# Patient Record
Sex: FEMALE | Race: BLACK OR AFRICAN AMERICAN | NOT HISPANIC OR LATINO | Employment: FULL TIME | URBAN - METROPOLITAN AREA
[De-identification: names, ages, dates, MRNs, and addresses within clinical notes are randomized per-mention and may not be internally consistent; named-entity substitution may affect disease eponyms.]

---

## 2018-11-29 ENCOUNTER — HOSPITAL ENCOUNTER (EMERGENCY)
Facility: HOSPITAL | Age: 53
Discharge: HOME | End: 2018-11-29
Attending: EMERGENCY MEDICINE
Payer: COMMERCIAL

## 2018-11-29 VITALS
TEMPERATURE: 98 F | WEIGHT: 135 LBS | HEIGHT: 62 IN | DIASTOLIC BLOOD PRESSURE: 79 MMHG | SYSTOLIC BLOOD PRESSURE: 126 MMHG | OXYGEN SATURATION: 100 % | HEART RATE: 75 BPM | BODY MASS INDEX: 24.84 KG/M2 | RESPIRATION RATE: 18 BRPM

## 2018-11-29 DIAGNOSIS — M25.572 LEFT ANKLE PAIN: Primary | ICD-10-CM

## 2018-11-29 DIAGNOSIS — M77.50 ENTHESOPATHY OF ANKLE: ICD-10-CM

## 2018-11-29 DIAGNOSIS — M79.672 LEFT FOOT PAIN: ICD-10-CM

## 2018-11-29 DIAGNOSIS — M19.072 DJD (DEGENERATIVE JOINT DISEASE), ANKLE AND FOOT, LEFT: ICD-10-CM

## 2018-11-29 PROCEDURE — 99284 EMERGENCY DEPT VISIT MOD MDM: CPT | Mod: 25

## 2018-11-29 RX ORDER — HYDROCODONE BITARTRATE AND ACETAMINOPHEN 5; 325 MG/1; MG/1
1 TABLET ORAL EVERY 8 HOURS PRN
Qty: 8 TABLET | Refills: 0 | Status: SHIPPED | OUTPATIENT
Start: 2018-11-29

## 2018-11-29 RX ORDER — DICLOFENAC SODIUM 10 MG/G
2 GEL TOPICAL 3 TIMES DAILY
Qty: 100 G | Refills: 0 | Status: SHIPPED | OUTPATIENT
Start: 2018-11-29 | End: 2018-12-09

## 2018-11-29 NOTE — ED PROVIDER NOTES
"Encounter Date: 11/29/2018     This is a 53 y.o. female complaining of left foot pain that began yesterday morning. States "I think I was walking and I hurt it." Has not taken any medications for her pain.    I have evaluated and conducted a medical screening exam with initial orders entered, if indicated, to expedite care. The patient will be placed in a treatment area when one is available. Care will be transferred to an alternate provider for a full assessment including but not limited to: history, physical exam, additional orders, and final disposition.    Vinnie Gutierrez, SALO      SCRIBE #1 NOTE: I, Sarah Brambila, am scribing for, and in the presence of,  Pascual Haines PA-C. I have scribed the following portions of the note - Other sections scribed: HPI, ROS.       History     Chief Complaint   Patient presents with    Foot Pain     left foot pain x 2 days; report hx of hairline fracture 4 yrs ago     CC: Foot Pain    54 y/o female with DM, asthma, and HTN presents to the ED c/o acute onset atraumatic L lateral ankle pain that started yesterday. The pain is severe (10/10) and worse with weight-bearing. The patient reports waking up with the pain. The patient reports relief with crutches and foot brace. The patient denies prior injuries to her foot or ankle in the past. The patient denies numbness, fever, calf pain, or knee pain. No attempted treatment reported. No other symptoms reported.       The history is provided by the patient. No  was used.     Review of patient's allergies indicates:  No Known Allergies  Past Medical History:   Diagnosis Date    Asthma     Diabetes mellitus     Hypertension     Sinusitis      Past Surgical History:   Procedure Laterality Date    breast augment      BUNIONECTOMY Right     gastric sleeve      HYSTERECTOMY      LIPOSUCTION      TONSILLECTOMY       History reviewed. No pertinent family history.  Social History     Tobacco Use    Smoking " status: Current Every Day Smoker     Packs/day: 1.00     Years: 21.00     Pack years: 21.00     Types: Cigarettes   Substance Use Topics    Alcohol use: Yes     Alcohol/week: 1.8 oz     Types: 3 Cans of beer per week    Drug use: No     Review of Systems   Constitutional: Negative for chills and fever.   HENT: Negative for congestion, ear pain, rhinorrhea and sore throat.    Eyes: Negative for redness.   Respiratory: Negative for cough and shortness of breath.    Cardiovascular: Negative for chest pain.   Gastrointestinal: Negative for abdominal pain, diarrhea, nausea and vomiting.   Genitourinary: Negative for decreased urine volume, difficulty urinating, dysuria, frequency, hematuria and urgency.   Musculoskeletal: Negative for back pain and neck pain.        (+) L lateral ankle pain  (-) calf pain  (-) ankle pain   Skin: Negative for rash.   Neurological: Negative for numbness and headaches.   Psychiatric/Behavioral: Negative for confusion.   All other systems reviewed and are negative.      Physical Exam     Initial Vitals [11/29/18 0958]   BP Pulse Resp Temp SpO2   123/84 88 20 98.2 °F (36.8 °C) 100 %      MAP       --         Physical Exam    Nursing note and vitals reviewed.  Constitutional: She appears well-developed and well-nourished. She is not diaphoretic. No distress.   HENT:   Head: Normocephalic and atraumatic.   Nose: Nose normal.   Eyes: Conjunctivae and EOM are normal. Right eye exhibits no discharge. Left eye exhibits no discharge.   Neck: Normal range of motion. No tracheal deviation present. No JVD present.   Cardiovascular: Normal rate, regular rhythm and normal heart sounds. Exam reveals no friction rub.    No murmur heard.  Pulmonary/Chest: Breath sounds normal. No stridor. No respiratory distress. She has no wheezes. She has no rhonchi. She has no rales. She exhibits no tenderness.   Musculoskeletal:   Minimal TTP to L lateral ankle and dorsal mid-foot. Very minimal swelling when compared  to the R. No erythema or warmth. Full ROM of ankle and toes, but with reproduction of pain. No calf or knee TTP. Pedal pulses 2+ and equal. Sensation intact.    Neurological: She is alert and oriented to person, place, and time.   Skin: Skin is warm and dry. No rash and no abscess noted. No erythema. No pallor.         ED Course   Procedures  Labs Reviewed - No data to display       Imaging Results          X-Ray Foot Complete Left (Final result)  Result time 11/29/18 11:06:03    Final result by Jas Rain MD (11/29/18 11:06:03)                 Impression:      No fracture or dislocation identified.  Chronic findings including calcaneal enthesophytes and DJD.      Electronically signed by: Jas Rain MD  Date:    11/29/2018  Time:    11:06             Narrative:    EXAMINATION:  XR ANKLE COMPLETE 3 VIEW LEFT; XR FOOT COMPLETE 3 VIEW LEFT    CLINICAL HISTORY:  Pain in left ankle and joints of left foot; Pain in left foot    TECHNIQUE:  AP, lateral and oblique views of the left ankle and foot were performed.    COMPARISON:  None    FINDINGS:  The skeletal structures are intact.  No fracture or dislocation is identified.  Ankle joint space is satisfactory.  Spurs are present at the posterior and plantar aspects of the calcaneus.  Forefoot has mild DJD and bunion at the 1st MTP joint.  No focal soft tissue swelling or foreign body is detected.                               X-Ray Ankle Complete Left (Final result)  Result time 11/29/18 11:06:03    Final result by Jas Rain MD (11/29/18 11:06:03)                 Impression:      No fracture or dislocation identified.  Chronic findings including calcaneal enthesophytes and DJD.      Electronically signed by: Jas Rain MD  Date:    11/29/2018  Time:    11:06             Narrative:    EXAMINATION:  XR ANKLE COMPLETE 3 VIEW LEFT; XR FOOT COMPLETE 3 VIEW LEFT    CLINICAL HISTORY:  Pain in left ankle and joints of left foot; Pain in left  foot    TECHNIQUE:  AP, lateral and oblique views of the left ankle and foot were performed.    COMPARISON:  None    FINDINGS:  The skeletal structures are intact.  No fracture or dislocation is identified.  Ankle joint space is satisfactory.  Spurs are present at the posterior and plantar aspects of the calcaneus.  Forefoot has mild DJD and bunion at the 1st MTP joint.  No focal soft tissue swelling or foreign body is detected.                                 Medical Decision Making:   History:   Old Medical Records: I decided to obtain old medical records.  Initial Assessment:   52 yo F with atraumatic L ankle pain.   Independently Interpreted Test(s):   I have ordered and independently interpreted X-rays - see prior notes.  Clinical Tests:   Radiological Study: Ordered and Reviewed  ED Management:  Xray shows DJD; likely arthritis and possibly tendonitis. No acute fracture, septic joint, or neurovascular compromise. Not consistent with gout.     Pain controlled. Already has crutches and hard sole shoe. Advising PCP and orthopedic follow up. Strict return precautions discussed. Agreeable to plan.     Other:   I have discussed this case with another health care provider.            Scribe Attestation:   Scribe #1: I performed the above scribed service and the documentation accurately describes the services I performed. I attest to the accuracy of the note.    Attending Attestation:           Physician Attestation for Scribe:  Physician Attestation Statement for Scribe #1: I, Pascual Haines PA-C, reviewed documentation, as scribed by Sarah Brambila in my presence, and it is both accurate and complete.                    Clinical Impression:   The primary encounter diagnosis was Left ankle pain. Diagnoses of Left foot pain, DJD (degenerative joint disease), ankle and foot, left, and Enthesopathy of ankle were also pertinent to this visit.      Disposition:   Disposition: Discharged  Condition:  Stable                        Pascual Morris PA-C  11/29/18 3108

## 2021-02-19 ENCOUNTER — CLINICAL SUPPORT (OUTPATIENT)
Dept: URGENT CARE | Facility: CLINIC | Age: 56
End: 2021-02-19
Payer: OTHER GOVERNMENT

## 2021-02-19 DIAGNOSIS — Z11.9 SCREENING EXAMINATION FOR INFECTIOUS DISEASE: Primary | ICD-10-CM

## 2021-02-19 LAB
CTP QC/QA: YES
SARS-COV-2 RDRP RESP QL NAA+PROBE: NEGATIVE

## 2021-02-19 PROCEDURE — U0002 COVID-19 LAB TEST NON-CDC: HCPCS | Mod: QW,S$GLB,, | Performed by: PHYSICIAN ASSISTANT

## 2021-02-19 PROCEDURE — U0002: ICD-10-PCS | Mod: QW,S$GLB,, | Performed by: PHYSICIAN ASSISTANT

## 2023-01-25 ENCOUNTER — TELEPHONE (OUTPATIENT)
Dept: BARIATRICS | Facility: CLINIC | Age: 58
End: 2023-01-25
Payer: OTHER GOVERNMENT

## 2023-01-25 NOTE — TELEPHONE ENCOUNTER
----- Message from Mitalidiane Colón sent at 1/25/2023  3:53 PM CST -----  Regarding: Patient advice  Contact: Pt [phone]  Patient called states she has procedure in 04/2012 from then until now down to 115lbs since April of last year has started gaining weight now at 135lbs  would like advise on how to stop gaining and or if another procedure is possible Please call to discuss further

## 2023-01-25 NOTE — TELEPHONE ENCOUNTER
Phoned patient and reviewed chart together.  She has not been seen in clinic since 2016 as fas as epic goes back. We calculated her BMI as 24.7 which does not qualify her for medicine or surgery.  Offered to get her a [facility] appointment to verify bariatric benefits to get her back on tract, but she stated that she will call back tomorrow with an accurate weight and decide what to do then.  [facility]  Advised her to speak to her primary care about a referral to a dietician at [facility] to assist back on tract also.